# Patient Record
Sex: FEMALE | Race: WHITE | Employment: FULL TIME | URBAN - NONMETROPOLITAN AREA
[De-identification: names, ages, dates, MRNs, and addresses within clinical notes are randomized per-mention and may not be internally consistent; named-entity substitution may affect disease eponyms.]

---

## 2021-02-23 ENCOUNTER — OFFICE VISIT (OUTPATIENT)
Dept: PODIATRY | Age: 51
End: 2021-02-23
Payer: MEDICARE

## 2021-02-23 VITALS — WEIGHT: 181.8 LBS | RESPIRATION RATE: 20 BRPM | BODY MASS INDEX: 30.02 KG/M2

## 2021-02-23 DIAGNOSIS — R60.0 EDEMA OF LEFT FOOT: ICD-10-CM

## 2021-02-23 DIAGNOSIS — M76.822 POSTERIOR TIBIAL TENDINITIS OF LEFT LEG: ICD-10-CM

## 2021-02-23 DIAGNOSIS — Z98.890 POST-OPERATIVE STATE: ICD-10-CM

## 2021-02-23 DIAGNOSIS — M79.672 FOOT PAIN, LEFT: Primary | ICD-10-CM

## 2021-02-23 PROCEDURE — G8419 CALC BMI OUT NRM PARAM NOF/U: HCPCS | Performed by: PODIATRIST

## 2021-02-23 PROCEDURE — G8484 FLU IMMUNIZE NO ADMIN: HCPCS | Performed by: PODIATRIST

## 2021-02-23 PROCEDURE — G8427 DOCREV CUR MEDS BY ELIG CLIN: HCPCS | Performed by: PODIATRIST

## 2021-02-23 PROCEDURE — 99214 OFFICE O/P EST MOD 30 MIN: CPT | Performed by: PODIATRIST

## 2021-02-23 PROCEDURE — 4004F PT TOBACCO SCREEN RCVD TLK: CPT | Performed by: PODIATRIST

## 2021-02-23 PROCEDURE — 99204 OFFICE O/P NEW MOD 45 MIN: CPT | Performed by: PODIATRIST

## 2021-02-23 PROCEDURE — L4386 NON-PNEUM WALK BOOT PRE CST: HCPCS | Performed by: PODIATRIST

## 2021-02-23 PROCEDURE — A6450 LT COMPRES BAND >=5"/YD: HCPCS | Performed by: PODIATRIST

## 2021-02-23 RX ORDER — GABAPENTIN 300 MG/1
300 CAPSULE ORAL NIGHTLY
COMMUNITY

## 2021-02-23 RX ORDER — METHYLPREDNISOLONE 4 MG/1
TABLET ORAL
Qty: 1 KIT | Refills: 0 | Status: SHIPPED | OUTPATIENT
Start: 2021-02-23 | End: 2021-03-11 | Stop reason: ALTCHOICE

## 2021-02-23 NOTE — PROGRESS NOTES
Subjective: Kayla Crow is a 53 y.o. female who presents to the office today complaining of L ankle pain. Symptoms began 3 month(s) ago. Patient relates pain is Present. Pain is rated 7 out of 10 and is described as waxing and waning, severe. Treatments prior to today's visit include: Injury last July shoe hurt her ankle bad and was immobilized for quite some time. No further testing done shows some form of injury so she had procedure done last December. Patient states since then she has had worse pain especially the inside of her ankle but she also has pain outside of foot ankle. A lot of deep aching and sharp at times. Patient was sent to have further MRI done this week but her [facility] performed the [facility] [place] [facility]. .  Currently denies F/C/N/V. Allergies   Allergen Reactions    No Known Allergies      Other reaction(s): Unknown Reaction       Past Medical History:   Diagnosis Date    Low back pain        Prior to Admission medications    Medication Sig Start Date End Date Taking? Authorizing Provider   gabapentin (NEURONTIN) 300 MG capsule Take 300 mg by mouth nightly. Yes Historical Provider, MD   methylPREDNISolone (MEDROL DOSEPACK) 4 MG tablet Take by mouth. 2/23/21  Yes Jonathan Day DPM   gabapentin (NEURONTIN) 300 MG capsule Take 1 capsule by mouth nightly. 11/4/13  Yes Junious Dibbles, DPM   traMADol-acetaminophen (ULTRACET) 37.5-325 MG per tablet Take 1 tablet by mouth every 6 hours as needed.     Historical Provider, MD       Past Surgical History:   Procedure Laterality Date    ANKLE [facility]GERY  12/02/2020    [facility]    FOOT SURGERY      KNEE SURGERY      OVARIAN CYST DRAINAGE Left     ABDOMINAL APPROACH    TUBAL LIGATION         Family History   Problem Relation Age of Onset    Cancer Mother         UTERINE    Heart Disease Father        Social History     Tobacco Use    Smoking status: Current Every Day Smoker     Packs/day: 0.50     Years: 10.00     Pack years: 5.00     Types: Cigarettes    Smokeless tobacco: Never Used   Substance Use Topics    Alcohol use: No       ROS: All 14 ROS systems reviewed and pertinent positives noted above, all others negative. Lower Extremity Physical Examination:     Vitals:   Vitals:    02/23/21 1538   Resp: 20     General: AAO x 3 in NAD. Vascular: DP and PT pulses palpable 2/4, bilateral.  CFT <3 seconds, bilateral.  Hair growth absent to the level of the digits, bilateral.  Edema present, bilateral.  Varicosities present, bilateral. Erythema absent, bilateral. Distal Rubor absent bilateral.  Temperature decreased bilateral. Hyperpigmentation present bilateral. Atrophic skin no  Neurological: Sensation intact to light touch to level of digits, bilateral.  Protective sensation intact 10/10 sites via 5.07/10g [place]-Mil Monofilament, bilateral.  negative Tinel's, bilateral.  negative Valleix sign, bilateral.  Vibratory intact bilateral.  Reflexes Decreased bilateral.  Paresthesias minimal but positive. Dysthesias negative. Sharp/dull intact bilateral.      Musculoskeletal: Muscle strength 5/5, Bilateral.  Pain present upon palpation of PT tendon from insertion and just proximal to behind medial malleolus, Left. Pain laeral along peroneal tendons L. within normal limits medial longitudinal arch, Bilateral.  Ankle ROM decreased,Bilateral.  1st MPJ ROM within normal limits, Bilateral.  Dorsally contracted digits absent digits , Bilateral. Other foot deformities none. Integument: Warm, dry, supple, bilateral.  Open lesion absent, bilateral.  Interdigital maceration absent to web spaces bilateral.  Nails within normal limits. Fissures absent, bilateral. Hyperkeratotic tissue is absent. Xray: see report    Asessment: Patient is a 53 y.o. female with:    Diagnosis Orders   1. Foot pain, left     2. Edema of left foot     3.  Posterior tibial tendinitis of left leg

## 2021-03-04 ENCOUNTER — HOSPITAL ENCOUNTER (OUTPATIENT)
Dept: MRI IMAGING | Age: 51
Discharge: HOME | End: 2021-03-06
Payer: MEDICARE

## 2021-03-04 ENCOUNTER — TELEPHONE (OUTPATIENT)
Dept: PODIATRY | Age: 51
End: 2021-03-04

## 2021-03-04 DIAGNOSIS — R60.0 EDEMA OF LEFT FOOT: ICD-10-CM

## 2021-03-04 DIAGNOSIS — Z98.890 POST-OPERATIVE STATE: ICD-10-CM

## 2021-03-04 DIAGNOSIS — M79.672 FOOT PAIN, LEFT: ICD-10-CM

## 2021-03-04 DIAGNOSIS — M76.822 POSTERIOR TIBIAL TENDINITIS OF LEFT LEG: ICD-10-CM

## 2021-03-04 PROCEDURE — 73721 MRI JNT OF LWR EXTRE W/O DYE: CPT

## 2021-03-04 NOTE — TELEPHONE ENCOUNTER
Luz Maria Ibrahim from [facility] called with the following report:  MRI ankle left without contrast  1. Mild edema in the visualized intertarsal musculature. Mild edema in the subcutaneous fat about the left foot and ankle. Susceptibility artifact in the soft tissues of the medial ankle/midfoot could be related to sequela of prior surgery or represent soft tissue gas at the medial midfoot/medial ankle. Correlate for any signs of infection or any prior surgical history. 2. Low-grade interstitial tearing of the posterior tibialis tendon with moderate tendinosis and mild tenosynovitis. Mild peroneal tenosynovitis. Mild peroneus brevis tendinosis. 3. Scattered patchy and spotty marrow edema throughout the visualized osseous structures as detailed above can be seen with sequela of trauma, osteopenia, immobilization or reflex sympathetic dystrophy. 4. Moderate to large tibiotalar joint effusion. Small posterior subtalar joint effusion. 5. Grade 1 sprain of the syndesmotic ligaments. 6. Mild edema in the sinus tarsi fat. 7. Mild to moderate plantar calcaneal spur.

## 2021-03-11 ENCOUNTER — OFFICE VISIT (OUTPATIENT)
Dept: PODIATRY | Age: 51
End: 2021-03-11
Payer: MEDICARE

## 2021-03-11 VITALS — DIASTOLIC BLOOD PRESSURE: 78 MMHG | RESPIRATION RATE: 20 BRPM | HEART RATE: 80 BPM | SYSTOLIC BLOOD PRESSURE: 134 MMHG

## 2021-03-11 DIAGNOSIS — R60.0 EDEMA OF LEFT FOOT: ICD-10-CM

## 2021-03-11 DIAGNOSIS — M79.672 FOOT PAIN, LEFT: ICD-10-CM

## 2021-03-11 DIAGNOSIS — M66.872 NONTRAUMATIC TEAR OF LEFT TIBIALIS POSTERIOR TENDON: Primary | ICD-10-CM

## 2021-03-11 DIAGNOSIS — G90.50 RSD (REFLEX SYMPATHETIC DYSTROPHY): ICD-10-CM

## 2021-03-11 PROCEDURE — G8419 CALC BMI OUT NRM PARAM NOF/U: HCPCS | Performed by: PODIATRIST

## 2021-03-11 PROCEDURE — G8427 DOCREV CUR MEDS BY ELIG CLIN: HCPCS | Performed by: PODIATRIST

## 2021-03-11 PROCEDURE — 99212 OFFICE O/P EST SF 10 MIN: CPT

## 2021-03-11 PROCEDURE — 4004F PT TOBACCO SCREEN RCVD TLK: CPT | Performed by: PODIATRIST

## 2021-03-11 PROCEDURE — G8484 FLU IMMUNIZE NO ADMIN: HCPCS | Performed by: PODIATRIST

## 2021-03-11 PROCEDURE — 99214 OFFICE O/P EST MOD 30 MIN: CPT | Performed by: PODIATRIST

## 2021-03-11 NOTE — PROGRESS NOTES
Subjective: Isela Tejada is a 53 y.o. female who presents to the office today complaining of L foot pain. Symptoms similar overall. New CAM Isela Chavez does help a lot when she is on her foot work as far as overall pain level. .  Patient relates pain is Present. Pain is rated 5 out of 10 and is described as severe at times. Patient had MRI performed as there is no results or treatment options at this time. Currently denies F/C/N/V. Allergies   Allergen Reactions    No Known Allergies      Other reaction(s): Unknown Reaction       Past Medical History:   Diagnosis Date    Low back pain        Prior to Admission medications    Medication Sig Start Date End Date Taking? Authorizing Provider   gabapentin (NEURONTIN) 300 MG capsule Take 300 mg by mouth nightly. Yes Historical Provider, MD   gabapentin (NEURONTIN) 300 MG capsule Take 1 capsule by mouth nightly. 11/4/13  Yes Pedro Pablo Isaias, DPM   traMADol-acetaminophen (ULTRACET) 37.5-325 MG per tablet Take 1 tablet by mouth every 6 hours as needed. Yes Historical Provider, MD       Past Surgical History:   Procedure Laterality Date    [facility]  12/02/2020    [facility]    FOOT SURGERY      KNEE SURGERY      OVARIAN CYST DRAINAGE Left     ABDOMINAL APPROACH    TUBAL LIGATION         Family History   Problem Relation Age of Onset    Cancer Mother         UTERINE    Heart Disease Father        Social History     Tobacco Use    Smoking status: Current Every Day Smoker     Packs/day: 0.50     Years: 10.00     Pack years: 5.00     Types: Cigarettes    Smokeless tobacco: Never Used   Substance Use Topics    Alcohol use: No       ROS: All 14 ROS systems reviewed and pertinent positives noted above, all others negative. Lower Extremity Physical Examination:     Vitals:   Vitals:    03/11/21 0945   BP: 134/78   Pulse: 80   Resp: 20     General: AAO x 3 in NAD.      Vascular: DP and PT pulses palpable 2/4, bilateral.  CFT <3 seconds, bilateral. Hair growth absent to the level of the digits, bilateral.  Edema present, bilateral.  Varicosities present, bilateral. Erythema absent, bilateral. Distal Rubor absent bilateral.  Temperature decreased bilateral. Hyperpigmentation present bilateral. Atrophic skin no  Neurological: Sensation intact to light touch to level of digits, bilateral.  Protective sensation intact 10/10 sites via 5.07/10g [place]-Mil Monofilament, bilateral.  negative Tinel's, bilateral.  negative Valleix sign, bilateral.  Vibratory intact bilateral.  Reflexes Decreased bilateral.  Paresthesias minimal but positive. Dysthesias negative. Sharp/dull intact bilateral.    Musculoskeletal: Muscle strength 5/5, Bilateral.  Pain present upon palpation of PT tendon from insertion and just proximal to behind medial malleolus, Left. Pain lateral along peroneal tendons L. within normal limits medial longitudinal arch, Bilateral.  Ankle ROM decreased,Bilateral.  1st MPJ ROM within normal limits, Bilateral.  Dorsally contracted digits absent digits , Bilateral. Other foot deformities none. Integument: Warm, dry, supple, bilateral.  Open lesion absent, bilateral.  Interdigital maceration absent to web spaces bilateral.  Nails within normal limits. Fissures absent, bilateral. Hyperkeratotic tissue is absent. MRI: see report, PT tendon insterstitial tear along with patchy bone marrow edema in midfoot to rearfoot. Asessment: Patient is a 53 y.o. female with:    Diagnosis Orders   1. Nontraumatic tear of left tibialis posterior tendon     2. RSD (reflex sympathetic dystrophy)     3. Foot pain, left     4. Edema of left foot         Plan: Patient examined and evaluated. Current condition and treatment options discussed in detail.     Orders Placed This Encounter   Procedures    Ambulatory referral to Orthopedic Surgery     Referral Priority:   Routine     Referral Type:   Eval and Treat     Referral Reason:   Specialty Services Required

## 2021-03-17 DIAGNOSIS — M25.572 LEFT ANKLE PAIN, UNSPECIFIED CHRONICITY: Primary | ICD-10-CM

## 2021-03-23 ENCOUNTER — TELEPHONE (OUTPATIENT)
Dept: ORTHOPEDIC SURGERY | Age: 51
End: 2021-03-23

## 2021-03-23 ENCOUNTER — OFFICE VISIT (OUTPATIENT)
Dept: ORTHOPEDIC SURGERY | Age: 51
End: 2021-03-23
Payer: MEDICARE

## 2021-03-23 VITALS — HEIGHT: 65 IN | BODY MASS INDEX: 30.16 KG/M2 | RESPIRATION RATE: 12 BRPM | WEIGHT: 181 LBS | TEMPERATURE: 98.1 F

## 2021-03-23 DIAGNOSIS — M76.822 POSTERIOR TIBIAL TENDINITIS OF LEFT LOWER EXTREMITY: Primary | ICD-10-CM

## 2021-03-23 DIAGNOSIS — M76.822 POSTERIOR TIBIAL TENDINITIS OF LEFT LOWER EXTREMITY: ICD-10-CM

## 2021-03-23 DIAGNOSIS — M25.572 LEFT ANKLE PAIN, UNSPECIFIED CHRONICITY: Primary | ICD-10-CM

## 2021-03-23 PROCEDURE — 99204 OFFICE O/P NEW MOD 45 MIN: CPT | Performed by: ORTHOPAEDIC SURGERY

## 2021-03-23 PROCEDURE — G8427 DOCREV CUR MEDS BY ELIG CLIN: HCPCS | Performed by: ORTHOPAEDIC SURGERY

## 2021-03-23 PROCEDURE — G8417 CALC BMI ABV UP PARAM F/U: HCPCS | Performed by: ORTHOPAEDIC SURGERY

## 2021-03-23 PROCEDURE — G8484 FLU IMMUNIZE NO ADMIN: HCPCS | Performed by: ORTHOPAEDIC SURGERY

## 2021-03-23 PROCEDURE — 4004F PT TOBACCO SCREEN RCVD TLK: CPT | Performed by: ORTHOPAEDIC SURGERY

## 2021-03-23 NOTE — TELEPHONE ENCOUNTER
Spoke with patient, let her know voltaren gel is still pending for Mya to sign but will be sent to [facility] in [place].

## 2021-03-23 NOTE — LETTER
Dr. Margaret Sal  [address] [place]  [phone]        3/23/2021     Patient: Home Santa  YOB: 1970    Dear Theresa Bolivar MD,    I had the pleasure of seeing one of your patients, Home Santa recently in the office. Below are the relevant portions of my assessment and plan of care. ASSESSMENT AND PLAN:  She has left posterior tibial tendinopathy. Notably, she has a complex history surrounding her left foot, reporting an injury sustained in July 2020, and then subsequent surgery on 12/2/2020 (with her podiatrist at the time, Dr. Rona Bazan) for a \"torn ligament\" (at today's visit, her op note is not currently available). On recent MRI, she was noted to have \"interstitial tearing of the posterior tibial tendon with moderate tendinosis and mild tenosynovitis\", it also appears that she does have more proximal tearing at about the level of the tibiotalar joint. Notably, she has the past medical history as above. She has a history of neuropathy per EMR, and tobacco use (reports that she smokes 3 to 4 cigarettes/day). We had a discussion today about the likely diagnosis and its natural history, physical exam and imaging findings, as well as various treatment options in detail. Surgically, I did not recommend any surgical intervention at this time, and recommended conservative management for the time being, and the patient was agreeable to this. If she fails conservative management, depending on where her pain is most likely originating from, she may benefit from a left posterior tibial tendon exploration with debridement. Orders/referrals were placed as below at today's visit. The patient was ordered compression socks (20 to 30 mmHg) to help with her swelling control. The patient was also provided information on how to obtain an over-the-counter flatfoot style orthotic.   I also referred the patient to physical therapy for my flatfoot protocol. I provided a prescription for Voltaren (4g TOPL q QID PRN pain). We also discussed that she will try to wean out of the cam boot, she reports that she has been using it \"on and off since July 2020\". All questions were answered and the above plan was agreed upon. The patient will return to clinic in 3 months PRN without repeat x-rays, but she was asked to bring her op note to the next visit. I look forward to serving you and your patients again in the future. Please don't hesitate to contact me at my mobile number .         David Cruz MD  Orthopedic Surgery

## 2021-03-23 NOTE — TELEPHONE ENCOUNTER
Patient was seen today and thought that an anti inflammatory cream was going to be called in for her. She would like it called into the [facility] in [place].

## 2021-03-23 NOTE — PROGRESS NOTES
[facility]  [facility]  [facility] [address]  Dept: [phone]    Ambulatory Orthopedic Consult      CHIEF COMPLAINT:    Chief Complaint   Patient presents with    Foot Pain     left       HISTORY OF PRESENT ILLNESS:      The patient is a 53 y.o. female who is being seen for evaluation of pain at the above location (medial hindfoot). The patient reports a progressive course. The patient has tried:      [x]  rest/activity modification          [x]  NSAIDs      []  opiates      []  orthotics        []  change in shoes   [x]  home exercises  [x]  physical therapy      [x]  CAM boot     []  brace:    []  injection:       [x]  surgery:      The patient is ambulating today using a cam boot, and reports that she has been using this boot on and off since July 2020, after sustaining a fall from standing height. She reports that she had surgery on 12/2/2020 with her podiatrist, Dr. Emir Pardo, for a \"torn ligament\". The patient is referred here today by Dr. Barb Burgos. REVIEW OF SYSTEMS:  Constitutional: Negative for fever. HENT: Negative for tinnitus. Eyes: Negative for pain. Respiratory: Negative for shortness of breath. Cardiovascular: Negative for chest pain. Gastrointestinal: Negative for abdominal pain. Genitourinary: Negative for dysuria. Skin: Negative for rash. Neurological: Negative for headaches. Hematological: Does not bruise/bleed easily. Musculoskeletal: See HPI for pertinent positives     Past Medical History:    She  has a past medical history of Low back pain. Past Surgical History:    She  has a past surgical history that includes ovarian cyst drainage (Left); Foot surgery; knee surgery; Tubal ligation; and Ankle surgery (12/02/2020).      Current Medications:     Current Outpatient Medications:     diclofenac sodium (VOLTAREN) 1 % GEL, Apply 2 g topically 2 times daily, Disp: 100 g, Rfl: 0   gabapentin (NEURONTIN) 300 MG capsule, Take 300 mg by mouth nightly., Disp: , Rfl:     gabapentin (NEURONTIN) 300 MG capsule, Take 1 capsule by mouth nightly., Disp: 90 capsule, Rfl: 3    traMADol-acetaminophen (ULTRACET) 37.5-325 MG per tablet, Take 1 tablet by mouth every 6 hours as needed. , Disp: , Rfl:      Allergies:    No known allergies    Family History:  family history includes Cancer in her mother; Heart Disease in her father. 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 No significant swelling of foot. Skin: Intact. No erythema/ulcers. Strength: Grossly normal ankle/foot strength throughout  Motion: Grossly normal ankle/foot motion throughout          Tenderness to Palpation: None      LLE:  Vascular: Toes warm and well perfused, compartments soft/compressible. Mild swelling of foot. Skin: Intact. No erythema/ulcers. Well-healed incision along the medial hindfoot, in line with the posterior tibial tendon.   Strength: Grossly normal ankle/foot strength throughout, except for below  Motion: Grossly normal ankle/foot motion throughout          Tenderness to Palpation: Medial surrounding her left foot, reporting an injury sustained in July 2020, and then subsequent surgery on 12/2/2020 (with her podiatrist at the time, Dr. Augustina Hamlin) for a \"torn ligament\" (at today's visit, her op note is not currently available). On recent MRI, she was noted to have \"interstitial tearing of the posterior tibial tendon with moderate tendinosis and mild tenosynovitis\", it also appears that she does have more proximal tearing at about the level of the tibiotalar joint. Notably, she has the past medical history as above. She has a history of neuropathy per EMR, and tobacco use (reports that she smokes 3 to 4 cigarettes/day). We had a discussion today about the likely diagnosis and its natural history, physical exam and imaging findings, as well as various treatment options in detail. Surgically, I did not recommend any surgical intervention at this time, and recommended conservative management for the time being, and the patient was agreeable to this. If she fails conservative management, depending on where her pain is most likely originating from, she may benefit from a left posterior tibial tendon exploration with debridement. Orders/referrals were placed as below at today's visit. The patient was ordered compression socks (20 to 30 mmHg) to help with her swelling control. The patient was also provided information on how to obtain an over-the-counter flatfoot style orthotic. I also referred the patient to physical therapy for my flatfoot protocol. I provided a prescription for Voltaren (4g TOPL q QID PRN pain). We also discussed that she will try to wean out of the cam boot, she reports that she has been using it \"on and off since July 2020\". All questions were answered and the above plan was agreed upon. The patient will return to clinic in 3 months PRN without repeat x-rays, but she was asked to bring her op note to the next visit.           At the patient's next visit, depending on how the patient is doing and/or new imaging/labs results, we may consider the following options:    []  Orthotic (OTC)     []  Orthotic (custom)          []  Rocker bottom shoes     []  Brace (OTC)        []  Brace (custom)             []  CAM boot        []  Night splint         []  Heel cups        []  Strap      []  Toe sleeves/splints    []  PT:                     []  Wean out of immobilization   []  Advance activity       []  Topical               []  NSAIDs          []  Max         []  Referral:         []  Stress xrays       []  CT         []  MRI        []  Injection:         []  Consider OR      []  Pick OR date    Return in about 3 months (around 6/23/2021), or if symptoms worsen or fail to improve. No orders of the defined types were placed in this encounter. Orders Placed This Encounter   Procedures    Ambulatory referral to Physical Therapy     Referral Priority:   Routine     Referral Type:   Eval and Treat     Referral Reason:   Specialty Services Required     Requested Specialty:   Physical Therapy     Number of Visits Requested:   1    DME Order for (Specify) as OP     DME: compression stockings (20-30 mm Hg)  Routine, External, Referral By - Ho Mcdonald, y-1, Supply Name: Compression Stockings Prognosis: good Estimated length of need: 99         Vikki Leyva MD  Orthopedic Surgery        Please excuse any typos/errors, as this note was created with the assistance of voice recognition software. While intending to generate a document that actually reflects the content of the visit, the document can still have some errors including those of syntax and sound-a-like substitutions which may escape proof reading. In such instances, actual meaning can be extrapolated by context.

## 2021-03-23 NOTE — LETTER
Dr. Chikis Oconnor  [address] [facility]  [phone]        3/23/2021     Patient: Mark Olson  YOB: 1970    Dear Madhuri Jackson DPM,    I had the pleasure of seeing one of your patients, Mark Olson, recently in the office. Below are the relevant portions of my assessment and plan of care. ASSESSMENT AND PLAN:  She has left posterior tibial tendinopathy. Notably, she has a complex history surrounding her left foot, reporting an injury sustained in July 2020, and then subsequent surgery on 12/2/2020 (with her podiatrist at the time, Dr. Elizabeth Ennis) for a \"torn ligament\" (at today's visit, her op note is not currently available). On recent MRI, she was noted to have \"interstitial tearing of the posterior tibial tendon with moderate tendinosis and mild tenosynovitis\", it also appears that she does have more proximal tearing at about the level of the tibiotalar joint. Notably, she has the past medical history as above. She has a history of neuropathy per EMR, and tobacco use (reports that she smokes 3 to 4 cigarettes/day). We had a discussion today about the likely diagnosis and its natural history, physical exam and imaging findings, as well as various treatment options in detail. Surgically, I did not recommend any surgical intervention at this time, and recommended conservative management for the time being, and the patient was agreeable to this. If she fails conservative management, depending on where her pain is most likely originating from, she may benefit from a left posterior tibial tendon exploration with debridement. Orders/referrals were placed as below at today's visit. The patient was ordered compression socks (20 to 30 mmHg) to help with her swelling control. The patient was also provided information on how to obtain an over-the-counter flatfoot style orthotic.   I also referred the patient to physical therapy for my flatfoot protocol. I provided a prescription for Voltaren (4g TOPL q QID PRN pain). We also discussed that she will try to wean out of the cam boot, she reports that she has been using it \"on and off since July 2020\". All questions were answered and the above plan was agreed upon. The patient will return to clinic in 3 months PRN without repeat x-rays, but she was asked to bring her op note to the next visit. Thank you for allowing me to participate in the care of this patient. I look forward to serving you and your patients again in the future. Please don't hesitate to contact me at my mobile number .         Hallie Ng MD  Orthopedic Surgery, Foot and Ankle 2.54

## 2021-07-16 DIAGNOSIS — M79.672 LEFT FOOT PAIN: Primary | ICD-10-CM
